# Patient Record
Sex: MALE | Race: WHITE | NOT HISPANIC OR LATINO | Employment: OTHER | URBAN - METROPOLITAN AREA
[De-identification: names, ages, dates, MRNs, and addresses within clinical notes are randomized per-mention and may not be internally consistent; named-entity substitution may affect disease eponyms.]

---

## 2020-11-24 NOTE — PATIENT DISCUSSION
Cataract surgery has been performed in the first eye and activities of daily living are still impaired. The patient would like to proceed with cataract surgery in the second eye as scheduled. The patient elects TMF +3.25 OD, goal of Cassandra.

## 2021-08-31 ENCOUNTER — GLAUCOMA EVAL (OUTPATIENT)
Dept: URBAN - METROPOLITAN AREA CLINIC 32 | Facility: CLINIC | Age: 86
End: 2021-08-31

## 2021-08-31 DIAGNOSIS — H40.1122: ICD-10-CM

## 2021-08-31 DIAGNOSIS — Z96.1: ICD-10-CM

## 2021-08-31 DIAGNOSIS — H40.1113: ICD-10-CM

## 2021-08-31 PROCEDURE — 92020 GONIOSCOPY: CPT

## 2021-08-31 PROCEDURE — 92004 COMPRE OPH EXAM NEW PT 1/>: CPT

## 2021-08-31 PROCEDURE — 92015 DETERMINE REFRACTIVE STATE: CPT

## 2021-08-31 PROCEDURE — 92133 CPTRZD OPH DX IMG PST SGM ON: CPT

## 2021-08-31 RX ORDER — NETARSUDIL AND LATANOPROST OPHTHALMIC SOLUTION, 0.02%/0.005% .2; .05 MG/ML; MG/ML
1 SOLUTION/ DROPS OPHTHALMIC; TOPICAL EVERY EVENING
Start: 2021-08-31

## 2021-08-31 ASSESSMENT — VISUAL ACUITY
OS_CC: J1
OS_SC: 20/40
OS_GLARE: >20/400

## 2021-08-31 ASSESSMENT — KERATOMETRY
OS_K1POWER_DIOPTERS: 44.25
OS_AXISANGLE_DEGREES: 135
OD_K1POWER_DIOPTERS: 44.75
OS_AXISANGLE2_DEGREES: 45
OD_AXISANGLE_DEGREES: 90
OD_AXISANGLE2_DEGREES: 180
OS_K2POWER_DIOPTERS: 43.50
OD_K2POWER_DIOPTERS: 44.50

## 2021-08-31 ASSESSMENT — TONOMETRY
OD_IOP_MMHG: 24
OS_IOP_MMHG: 9
OD_IOP_MMHG: 25
OS_IOP_MMHG: 8

## 2021-09-14 ENCOUNTER — IOP CHECK (OUTPATIENT)
Dept: URBAN - METROPOLITAN AREA CLINIC 32 | Facility: CLINIC | Age: 86
End: 2021-09-14

## 2021-09-14 DIAGNOSIS — H40.1122: ICD-10-CM

## 2021-09-14 DIAGNOSIS — H40.1113: ICD-10-CM

## 2021-09-14 PROCEDURE — 92012 INTRM OPH EXAM EST PATIENT: CPT

## 2021-09-14 ASSESSMENT — TONOMETRY
OS_IOP_MMHG: 6
OD_IOP_MMHG: 16

## 2021-09-14 ASSESSMENT — KERATOMETRY
OD_K1POWER_DIOPTERS: 44.75
OS_K2POWER_DIOPTERS: 43.50
OD_AXISANGLE_DEGREES: 90
OS_K1POWER_DIOPTERS: 44.25
OS_AXISANGLE2_DEGREES: 45
OD_K2POWER_DIOPTERS: 44.50
OS_AXISANGLE_DEGREES: 135
OD_AXISANGLE2_DEGREES: 180

## 2021-09-14 ASSESSMENT — VISUAL ACUITY: OS_SC: 20/40-

## 2021-11-15 ENCOUNTER — IOP CHECK (OUTPATIENT)
Dept: URBAN - METROPOLITAN AREA CLINIC 32 | Facility: CLINIC | Age: 86
End: 2021-11-15

## 2021-11-15 DIAGNOSIS — H40.1122: ICD-10-CM

## 2021-11-15 DIAGNOSIS — H40.1113: ICD-10-CM

## 2021-11-15 PROCEDURE — 99212 OFFICE O/P EST SF 10 MIN: CPT

## 2021-11-15 ASSESSMENT — KERATOMETRY
OS_K2POWER_DIOPTERS: 43.50
OD_AXISANGLE_DEGREES: 90
OS_AXISANGLE2_DEGREES: 45
OD_K1POWER_DIOPTERS: 44.75
OS_AXISANGLE_DEGREES: 135
OS_K1POWER_DIOPTERS: 44.25
OD_AXISANGLE2_DEGREES: 180
OD_K2POWER_DIOPTERS: 44.50

## 2021-11-15 ASSESSMENT — TONOMETRY
OD_IOP_MMHG: 32
OS_IOP_MMHG: 09
OS_IOP_MMHG: 07

## 2021-11-15 ASSESSMENT — VISUAL ACUITY: OS_SC: 20/40-1

## 2021-11-17 NOTE — PATIENT DISCUSSION
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  Patient states Dr. Hawa Mata will VF next year.  Recommend VF sooner after the yag ou.

## 2021-12-08 NOTE — PROCEDURE NOTE: SURGICAL
<p>Prior to commencing surgery patient identification, surgical procedure, site, and side were confirmed by Dr. Alberta Craig. <span>&nbsp; </span>Following topical proparacaine anesthesia, the patient was positioned at the YAG laser, a contact lens coupled to the cornea of the right eye with methylcellulose and an axial posterior capsulotomy performed without complication using 2.9 Mj x 43. Attention was then turned to the left eye and a contact lens coupled to the cornea of the left eye with methylcellulose and an axial posterior capsulotomy performed without complication using 3.0 Mj x 28. One drop of Alphagan was instilled in both eyes and the patient returned to the holding area having tolerated the procedure well and without complication. </p>MR [id number]

## 2021-12-08 NOTE — PATIENT DISCUSSION
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  Patient states Dr. Quintin Rdz will VF next year.  Recommend VF sooner after the yag ou.

## 2021-12-08 NOTE — PATIENT DISCUSSION
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  Patient states Dr. Rhina Crowley will VF next year.  Recommend VF sooner after the yag ou.

## 2021-12-15 NOTE — PATIENT DISCUSSION
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  Patient states Dr. Sima Watson will VF next year.  Recommend VF sooner after the yag ou. Patient declines.

## 2022-01-26 ENCOUNTER — FOLLOW UP (OUTPATIENT)
Dept: URBAN - METROPOLITAN AREA CLINIC 32 | Facility: CLINIC | Age: 87
End: 2022-01-26

## 2022-01-26 DIAGNOSIS — H40.1122: ICD-10-CM

## 2022-01-26 DIAGNOSIS — H40.1113: ICD-10-CM

## 2022-01-26 PROCEDURE — 92083 EXTENDED VISUAL FIELD XM: CPT

## 2022-01-26 PROCEDURE — 99213 OFFICE O/P EST LOW 20 MIN: CPT

## 2022-01-26 ASSESSMENT — TONOMETRY
OS_IOP_MMHG: 12
OS_IOP_MMHG: 11
OD_IOP_MMHG: 30

## 2022-01-26 ASSESSMENT — KERATOMETRY
OD_AXISANGLE_DEGREES: 90
OS_K2POWER_DIOPTERS: 43.50
OS_K1POWER_DIOPTERS: 44.25
OD_K1POWER_DIOPTERS: 44.75
OS_AXISANGLE2_DEGREES: 45
OD_K2POWER_DIOPTERS: 44.50
OD_AXISANGLE2_DEGREES: 180
OS_AXISANGLE_DEGREES: 135

## 2022-01-26 ASSESSMENT — VISUAL ACUITY: OS_SC: 20/40-1

## 2022-02-27 ASSESSMENT — KERATOMETRY
OD_K1POWER_DIOPTERS: 44.75
OD_AXISANGLE2_DEGREES: 180
OD_AXISANGLE_DEGREES: 90
OS_K1POWER_DIOPTERS: 44.25
OS_AXISANGLE2_DEGREES: 45
OD_K2POWER_DIOPTERS: 44.50
OS_K2POWER_DIOPTERS: 43.50
OS_AXISANGLE_DEGREES: 135

## 2022-02-28 ENCOUNTER — FOLLOW UP (OUTPATIENT)
Dept: URBAN - METROPOLITAN AREA CLINIC 32 | Facility: CLINIC | Age: 87
End: 2022-02-28

## 2022-02-28 DIAGNOSIS — H40.1113: ICD-10-CM

## 2022-02-28 DIAGNOSIS — H40.1122: ICD-10-CM

## 2022-02-28 PROCEDURE — 99212 OFFICE O/P EST SF 10 MIN: CPT

## 2022-02-28 ASSESSMENT — TONOMETRY
OD_IOP_MMHG: 22
OD_IOP_MMHG: 20
OS_IOP_MMHG: 10

## 2022-02-28 ASSESSMENT — VISUAL ACUITY: OS_SC: 20/30-2

## 2022-03-21 ENCOUNTER — FOLLOW UP (OUTPATIENT)
Dept: URBAN - METROPOLITAN AREA CLINIC 32 | Facility: CLINIC | Age: 87
End: 2022-03-21

## 2022-03-21 DIAGNOSIS — H40.1122: ICD-10-CM

## 2022-03-21 DIAGNOSIS — H40.1113: ICD-10-CM

## 2022-03-21 PROCEDURE — 99212 OFFICE O/P EST SF 10 MIN: CPT

## 2022-03-21 ASSESSMENT — KERATOMETRY
OS_AXISANGLE2_DEGREES: 45
OD_AXISANGLE2_DEGREES: 180
OS_K2POWER_DIOPTERS: 43.50
OS_AXISANGLE_DEGREES: 135
OD_K1POWER_DIOPTERS: 44.75
OD_K2POWER_DIOPTERS: 44.50
OD_AXISANGLE_DEGREES: 90
OS_K1POWER_DIOPTERS: 44.25

## 2022-03-21 ASSESSMENT — TONOMETRY
OD_IOP_MMHG: 14
OS_IOP_MMHG: 03
OS_IOP_MMHG: 05

## 2022-03-21 ASSESSMENT — VISUAL ACUITY: OS_SC: 20/40-2

## 2022-08-10 ENCOUNTER — COMPREHENSIVE EXAM (OUTPATIENT)
Dept: URBAN - METROPOLITAN AREA CLINIC 32 | Facility: CLINIC | Age: 87
End: 2022-08-10

## 2022-08-10 DIAGNOSIS — E11.9: ICD-10-CM

## 2022-08-10 DIAGNOSIS — H40.1113: ICD-10-CM

## 2022-08-10 DIAGNOSIS — H40.1122: ICD-10-CM

## 2022-08-10 PROCEDURE — 92014 COMPRE OPH EXAM EST PT 1/>: CPT

## 2022-08-10 PROCEDURE — 92133 CPTRZD OPH DX IMG PST SGM ON: CPT

## 2022-08-10 ASSESSMENT — KERATOMETRY
OS_AXISANGLE2_DEGREES: 45
OD_K2POWER_DIOPTERS: 44.50
OD_AXISANGLE_DEGREES: 90
OS_AXISANGLE_DEGREES: 135
OS_K2POWER_DIOPTERS: 43.50
OS_K1POWER_DIOPTERS: 44.25
OD_K1POWER_DIOPTERS: 44.75
OD_AXISANGLE2_DEGREES: 180

## 2022-08-10 ASSESSMENT — TONOMETRY
OD_IOP_MMHG: 16
OS_IOP_MMHG: 05

## 2022-08-10 ASSESSMENT — VISUAL ACUITY: OS_SC: 20/30-2

## 2022-12-08 ENCOUNTER — FOLLOW UP (OUTPATIENT)
Dept: URBAN - METROPOLITAN AREA CLINIC 32 | Facility: CLINIC | Age: 87
End: 2022-12-08

## 2022-12-08 PROCEDURE — 92083 EXTENDED VISUAL FIELD XM: CPT

## 2022-12-08 PROCEDURE — 92012 INTRM OPH EXAM EST PATIENT: CPT

## 2022-12-08 ASSESSMENT — KERATOMETRY
OD_AXISANGLE_DEGREES: 90
OD_AXISANGLE2_DEGREES: 180
OS_AXISANGLE_DEGREES: 135
OD_K2POWER_DIOPTERS: 44.50
OS_K1POWER_DIOPTERS: 44.25
OS_AXISANGLE2_DEGREES: 45
OS_K2POWER_DIOPTERS: 43.50
OD_K1POWER_DIOPTERS: 44.75

## 2022-12-08 ASSESSMENT — VISUAL ACUITY: OS_SC: 20/40

## 2022-12-08 ASSESSMENT — TONOMETRY
OD_IOP_MMHG: 19
OS_IOP_MMHG: 9

## 2023-05-10 ENCOUNTER — COMPREHENSIVE EXAM (OUTPATIENT)
Dept: URBAN - METROPOLITAN AREA CLINIC 32 | Facility: CLINIC | Age: 88
End: 2023-05-10

## 2023-05-10 DIAGNOSIS — H26.491: ICD-10-CM

## 2023-05-10 DIAGNOSIS — H40.1113: ICD-10-CM

## 2023-05-10 DIAGNOSIS — E11.9: ICD-10-CM

## 2023-05-10 DIAGNOSIS — H31.012: ICD-10-CM

## 2023-05-10 DIAGNOSIS — H04.123: ICD-10-CM

## 2023-05-10 DIAGNOSIS — H40.1122: ICD-10-CM

## 2023-05-10 DIAGNOSIS — H16.143: ICD-10-CM

## 2023-05-10 PROCEDURE — 92133 CPTRZD OPH DX IMG PST SGM ON: CPT

## 2023-05-10 PROCEDURE — 92014 COMPRE OPH EXAM EST PT 1/>: CPT

## 2023-05-10 ASSESSMENT — KERATOMETRY
OS_K1POWER_DIOPTERS: 43.25
OS_K1POWER_DIOPTERS: 44.25
OD_K1POWER_DIOPTERS: 44.75
OS_AXISANGLE_DEGREES: 135
OD_K2POWER_DIOPTERS: 44.50
OD_AXISANGLE2_DEGREES: 180
OS_K2POWER_DIOPTERS: 43.50
OS_AXISANGLE_DEGREES: 57
OD_AXISANGLE_DEGREES: 9
OD_AXISANGLE2_DEGREES: 99
OD_K1POWER_DIOPTERS: 44.00
OS_AXISANGLE2_DEGREES: 45
OS_AXISANGLE2_DEGREES: 147
OD_K2POWER_DIOPTERS: 43.75
OS_K2POWER_DIOPTERS: 44.00
OD_AXISANGLE_DEGREES: 90

## 2023-05-10 ASSESSMENT — VISUAL ACUITY
OS_SC: J7-1
OS_SC: 20/40
OS_CC: J1-1

## 2023-05-10 ASSESSMENT — TONOMETRY
OD_IOP_MMHG: 26
OS_IOP_MMHG: 6

## 2023-11-09 ENCOUNTER — FOLLOW UP (OUTPATIENT)
Dept: URBAN - METROPOLITAN AREA CLINIC 32 | Facility: CLINIC | Age: 88
End: 2023-11-09

## 2023-11-09 DIAGNOSIS — H40.1122: ICD-10-CM

## 2023-11-09 DIAGNOSIS — H40.1113: ICD-10-CM

## 2023-11-09 PROCEDURE — 92083 EXTENDED VISUAL FIELD XM: CPT

## 2023-11-09 PROCEDURE — 99213 OFFICE O/P EST LOW 20 MIN: CPT

## 2023-11-09 ASSESSMENT — KERATOMETRY
OD_AXISANGLE2_DEGREES: 180
OS_AXISANGLE2_DEGREES: 45
OS_AXISANGLE_DEGREES: 135
OS_K2POWER_DIOPTERS: 44.00
OD_K1POWER_DIOPTERS: 44.00
OS_K1POWER_DIOPTERS: 43.25
OS_AXISANGLE_DEGREES: 57
OD_K2POWER_DIOPTERS: 44.50
OS_K2POWER_DIOPTERS: 43.50
OS_AXISANGLE2_DEGREES: 147
OD_AXISANGLE_DEGREES: 90
OD_AXISANGLE_DEGREES: 9
OS_K1POWER_DIOPTERS: 44.25
OD_AXISANGLE2_DEGREES: 99
OD_K1POWER_DIOPTERS: 44.75
OD_K2POWER_DIOPTERS: 43.75

## 2023-11-09 ASSESSMENT — VISUAL ACUITY
OS_SC: J3
OS_SC: 20/50

## 2023-11-09 ASSESSMENT — TONOMETRY
OS_IOP_MMHG: 8
OD_IOP_MMHG: 30

## 2024-04-11 ENCOUNTER — COMPREHENSIVE EXAM (OUTPATIENT)
Dept: URBAN - METROPOLITAN AREA CLINIC 32 | Facility: CLINIC | Age: 89
End: 2024-04-11

## 2024-04-11 DIAGNOSIS — H26.491: ICD-10-CM

## 2024-04-11 DIAGNOSIS — H04.123: ICD-10-CM

## 2024-04-11 DIAGNOSIS — H31.012: ICD-10-CM

## 2024-04-11 DIAGNOSIS — H40.1113: ICD-10-CM

## 2024-04-11 DIAGNOSIS — H40.1122: ICD-10-CM

## 2024-04-11 DIAGNOSIS — E11.9: ICD-10-CM

## 2024-04-11 DIAGNOSIS — H16.143: ICD-10-CM

## 2024-04-11 PROCEDURE — 92133 CPTRZD OPH DX IMG PST SGM ON: CPT

## 2024-04-11 PROCEDURE — 99214 OFFICE O/P EST MOD 30 MIN: CPT

## 2024-04-11 ASSESSMENT — VISUAL ACUITY
OS_SC: 20/40
OS_CC: J1+

## 2024-04-11 ASSESSMENT — TONOMETRY
OD_IOP_MMHG: 30
OS_IOP_MMHG: 9

## 2024-04-11 ASSESSMENT — KERATOMETRY
OS_AXISANGLE_DEGREES: 140
OS_AXISANGLE2_DEGREES: 50
OS_K1POWER_DIOPTERS: 44.25
OS_K2POWER_DIOPTERS: 43.25

## 2024-10-03 ENCOUNTER — FOLLOW UP (OUTPATIENT)
Dept: URBAN - METROPOLITAN AREA CLINIC 32 | Facility: CLINIC | Age: 89
End: 2024-10-03

## 2024-10-03 DIAGNOSIS — H40.1113: ICD-10-CM

## 2024-10-03 DIAGNOSIS — H40.1122: ICD-10-CM

## 2024-10-03 PROCEDURE — 92250 FUNDUS PHOTOGRAPHY W/I&R: CPT

## 2024-10-03 PROCEDURE — 99213 OFFICE O/P EST LOW 20 MIN: CPT

## 2024-10-03 PROCEDURE — 92083 EXTENDED VISUAL FIELD XM: CPT

## 2025-04-01 ENCOUNTER — COMPREHENSIVE EXAM (OUTPATIENT)
Age: OVER 89
End: 2025-04-01

## 2025-04-01 DIAGNOSIS — E11.9: ICD-10-CM

## 2025-04-01 DIAGNOSIS — H40.1122: ICD-10-CM

## 2025-04-01 DIAGNOSIS — H40.1113: ICD-10-CM

## 2025-04-01 DIAGNOSIS — H04.123: ICD-10-CM

## 2025-04-01 DIAGNOSIS — H26.491: ICD-10-CM

## 2025-04-01 PROCEDURE — 92133 CPTRZD OPH DX IMG PST SGM ON: CPT

## 2025-04-01 PROCEDURE — 99214 OFFICE O/P EST MOD 30 MIN: CPT
